# Patient Record
Sex: FEMALE | Race: WHITE | NOT HISPANIC OR LATINO | Employment: FULL TIME | URBAN - METROPOLITAN AREA
[De-identification: names, ages, dates, MRNs, and addresses within clinical notes are randomized per-mention and may not be internally consistent; named-entity substitution may affect disease eponyms.]

---

## 2018-03-22 ENCOUNTER — TRANSCRIBE ORDERS (OUTPATIENT)
Dept: ADMINISTRATIVE | Facility: HOSPITAL | Age: 53
End: 2018-03-22

## 2018-03-22 ENCOUNTER — HOSPITAL ENCOUNTER (OUTPATIENT)
Dept: GENERAL RADIOLOGY | Facility: HOSPITAL | Age: 53
Discharge: HOME | End: 2018-03-22
Attending: FAMILY MEDICINE | Admitting: FAMILY MEDICINE

## 2018-03-22 DIAGNOSIS — M25.552 LEFT HIP PAIN: Primary | ICD-10-CM

## 2018-03-22 PROCEDURE — 73502 X-RAY EXAM HIP UNI 2-3 VIEWS: CPT

## 2019-11-13 RX ORDER — SODIUM, POTASSIUM,MAG SULFATES 17.5-3.13G
2 SOLUTION, RECONSTITUTED, ORAL ORAL TAKE AS DIRECTED
Qty: 354 ML | Refills: 0 | Status: SHIPPED | OUTPATIENT
Start: 2019-11-13 | End: 2019-11-13 | Stop reason: ALTCHOICE

## 2019-11-22 ENCOUNTER — OUTSIDE FACILITY SERVICE (OUTPATIENT)
Dept: GASTROENTEROLOGY | Facility: CLINIC | Age: 54
End: 2019-11-22

## 2019-11-22 PROCEDURE — G0105 COLORECTAL SCRN; HI RISK IND: HCPCS | Performed by: INTERNAL MEDICINE

## 2022-12-20 ENCOUNTER — OFFICE VISIT (OUTPATIENT)
Dept: PULMONOLOGY | Facility: CLINIC | Age: 57
End: 2022-12-20

## 2022-12-20 VITALS
TEMPERATURE: 97.6 F | HEIGHT: 63 IN | HEART RATE: 64 BPM | SYSTOLIC BLOOD PRESSURE: 128 MMHG | OXYGEN SATURATION: 99 % | BODY MASS INDEX: 25.69 KG/M2 | DIASTOLIC BLOOD PRESSURE: 80 MMHG | WEIGHT: 145 LBS

## 2022-12-20 DIAGNOSIS — J30.2 SEASONAL AND PERENNIAL ALLERGIC RHINITIS: ICD-10-CM

## 2022-12-20 DIAGNOSIS — Z87.891 FORMER SMOKER: ICD-10-CM

## 2022-12-20 DIAGNOSIS — K44.9 HIATAL HERNIA: ICD-10-CM

## 2022-12-20 DIAGNOSIS — K21.9 CHRONIC GERD: ICD-10-CM

## 2022-12-20 DIAGNOSIS — R05.9 COUGH, UNSPECIFIED TYPE: Primary | ICD-10-CM

## 2022-12-20 DIAGNOSIS — J30.89 SEASONAL AND PERENNIAL ALLERGIC RHINITIS: ICD-10-CM

## 2022-12-20 PROBLEM — R05.3 CHRONIC COUGH: Status: ACTIVE | Noted: 2022-12-20

## 2022-12-20 PROCEDURE — 94375 RESPIRATORY FLOW VOLUME LOOP: CPT | Performed by: INTERNAL MEDICINE

## 2022-12-20 PROCEDURE — 94726 PLETHYSMOGRAPHY LUNG VOLUMES: CPT | Performed by: INTERNAL MEDICINE

## 2022-12-20 PROCEDURE — 94729 DIFFUSING CAPACITY: CPT | Performed by: INTERNAL MEDICINE

## 2022-12-20 PROCEDURE — 99204 OFFICE O/P NEW MOD 45 MIN: CPT | Performed by: INTERNAL MEDICINE

## 2022-12-20 RX ORDER — LANOLIN ALCOHOL/MO/W.PET/CERES
1000 CREAM (GRAM) TOPICAL DAILY
COMMUNITY

## 2022-12-20 RX ORDER — CHOLECALCIFEROL (VITAMIN D3) 1250 MCG
CAPSULE ORAL EVERY 24 HOURS
COMMUNITY

## 2022-12-20 RX ORDER — FAMOTIDINE 40 MG/1
40 TABLET, FILM COATED ORAL
COMMUNITY
Start: 2022-10-24

## 2022-12-20 RX ORDER — DIPHENHYDRAMINE HYDROCHLORIDE 25 MG/1
TABLET ORAL 2 TIMES DAILY
COMMUNITY

## 2022-12-20 RX ORDER — ESTRADIOL 0.5 MG/1
0.5 TABLET ORAL DAILY
COMMUNITY
Start: 2022-10-24

## 2022-12-20 RX ORDER — CALCIUM CARBONATE/VITAMIN D3 500-10/5ML
LIQUID (ML) ORAL SEE ADMIN INSTRUCTIONS
COMMUNITY

## 2022-12-20 RX ORDER — FREMANEZUMAB-VFRM 225 MG/1.5ML
INJECTION SUBCUTANEOUS
COMMUNITY
Start: 2022-10-27

## 2022-12-20 RX ORDER — MULTIVIT WITH MINERALS/LUTEIN
250 TABLET ORAL DAILY
COMMUNITY

## 2022-12-20 RX ORDER — TIOTROPIUM BROMIDE INHALATION SPRAY 3.12 UG/1
2 SPRAY, METERED RESPIRATORY (INHALATION) DAILY
COMMUNITY
Start: 2022-10-24

## 2022-12-20 RX ORDER — LEVOCETIRIZINE DIHYDROCHLORIDE 5 MG/1
5 TABLET, FILM COATED ORAL DAILY
COMMUNITY
Start: 2022-12-12

## 2022-12-20 RX ORDER — PROGESTERONE 100 MG/1
100 CAPSULE ORAL
COMMUNITY
Start: 2022-10-24

## 2022-12-20 NOTE — PROGRESS NOTES
"  PULMONARY  NOTE    Chief Complaint     Chronic cough, remote smoker, hiatal hernia, perennial rhinitis    History of Present Illness     57-year-old female returns today for reevaluation  She was previously seen by Dr. Hoffmann over 10 years ago    She is had a chronic cough  This is been going on for over 10 years  The cough is never resolved  It waxes and wanes depending upon various factors  It will get worse if she has a respiratory tract infection  Its precipitated by a lot of different factors such as weather changes, laughing or talking, and various times of the day  Typically she will start coughing and then will produce \"tons of mucus\".  This is typically clear mucus and may be up to 1/4 cup  She is never had hemoptysis    There is nothing that she is identified that has helped with her cough    Past pulmonary work-up is included multiple PFTs, chest imaging, and a bronchoscopy  She is never exhibited airway obstruction on spirometry  She had a negative methacholine challenge and has been on multiple inhalers none of which have impacted her cough    She has seen allergy and immunology  She has had allergy testing and apparently has some limited environmental allergies  Immunotherapy has been discussed in the past but not pursued  She is on a variety of medications and supplements as outlined in her medication list    Her cough preceded her use of progesterone    She is had a GI evaluation  She has had an EGD and a 24-hour pH monitor  Apparently the 24-hour pH monitor was \"borderline\" although I do not have that result in the computer right now  She does try to follow reflux precautions    She has a chronic globus sensation    Patient Active Problem List   Diagnosis   • Chronic cough   • GERD   • Seasonal and perennial allergic rhinitis   • Remote smoker   • Hiatal hernia     No Known Allergies    Current Outpatient Medications:   •  Ajovy 225 MG/1.5ML solution prefilled syringe, , Disp: , Rfl:   •  Biotin " 5 MG capsule, 2 (Two) Times a Day., Disp: , Rfl:   •  Cholecalciferol (Vitamin D3) 1.25 MG (70268 UT) capsule, Daily., Disp: , Rfl:   •  Chromium Picolinate (CHROMIUM PICOLATE PO), Take  by mouth., Disp: , Rfl:   •  Coenzyme Q10 (Co Q 10) 100 MG capsule, Daily., Disp: , Rfl:   •  Cranberry 300 MG tablet, See Admin Instructions., Disp: , Rfl:   •  estradiol (ESTRACE) 0.5 MG tablet, Take 0.5 mg by mouth Daily., Disp: , Rfl:   •  famotidine (PEPCID) 40 MG tablet, Take 40 mg by mouth every night at bedtime., Disp: , Rfl:   •  levocetirizine (XYZAL) 5 MG tablet, Take 5 mg by mouth Daily., Disp: , Rfl:   •  Magnesium Oxide 400 MG capsule, See Admin Instructions., Disp: , Rfl:   •  Omega 3-6-9 Fatty Acids (OMEGA-3-6-9 PO), Take  by mouth., Disp: , Rfl:   •  Progesterone (PROMETRIUM) 100 MG capsule, Take 100 mg by mouth every night at bedtime., Disp: , Rfl:   •  Sod Picosulfate-Mag Ox-Cit Acd (CLENPIQ) 10-3.5-12 MG-GM -GM/160ML solution, Take 1 kit by mouth Take As Directed. Do Not Eat The Day Before Your Procedure, Call [phone] for questions., Disp: 2 bottle, Rfl: 0  •  Spiriva Respimat 2.5 MCG/ACT aerosol solution inhaler, Inhale 2 puffs Daily., Disp: , Rfl:   •  Turmeric (QC TUMERIC COMPLEX PO), Take  by mouth., Disp: , Rfl:   •  vitamin B-12 (CYANOCOBALAMIN) 1000 MCG tablet, Take 1,000 mcg by mouth Daily., Disp: , Rfl:   •  vitamin C (ASCORBIC ACID) 250 MG tablet, Take 250 mg by mouth Daily., Disp: , Rfl:   •  Zinc 50 MG capsule, Daily., Disp: , Rfl:   MEDICATION LIST AND ALLERGIES REVIEWED.    Family History   Problem Relation Age of Onset   • Stroke Mother    • Cancer Mother    • Pulmonary fibrosis Father    • Hypertension Father    • Alcohol abuse Sister      Social History     Tobacco Use   • Smoking status: Former     Years: 15.00     Types: Cigarettes     Quit date: 1993     Years since quittin.9   Vaping Use   • Vaping Use: Never used   Substance Use Topics   • Alcohol use: Yes     Alcohol/week: 3.0  "standard drinks     Types: 3 Glasses of wine per week   • Drug use: Never     Social History     Social History Narrative        Smoked for about 15 years, none since 1993    Drinks approximately 3 alcoholic beverages on a weekly basis     FAMILY AND SOCIAL HISTORY REVIEWED.    Review of Systems  ALSO REFER TO SCANNED ROS SHEET FROM SAME DATE.    /80   Pulse 64   Temp 97.6 °F (36.4 °C)   Ht 160 cm (63\")   Wt 65.8 kg (145 lb)   SpO2 99% Comment: RESTING, ROOM AIR  BMI 25.69 kg/m²   Physical Exam  Vitals and nursing note reviewed.   Constitutional:       General: She is not in acute distress.     Appearance: She is well-developed. She is not diaphoretic.   HENT:      Head: Normocephalic and atraumatic.   Neck:      Thyroid: No thyromegaly.   Cardiovascular:      Rate and Rhythm: Normal rate and regular rhythm.      Heart sounds: Normal heart sounds. No murmur heard.  Pulmonary:      Effort: Pulmonary effort is normal.      Breath sounds: Normal breath sounds. No stridor.   Lymphadenopathy:      Cervical: No cervical adenopathy.      Upper Body:      Right upper body: No supraclavicular or epitrochlear adenopathy.      Left upper body: No supraclavicular or epitrochlear adenopathy.   Skin:     General: Skin is warm and dry.   Neurological:      Mental Status: She is alert and oriented to person, place, and time.   Psychiatric:         Behavior: Behavior normal.         Results     Chest x-ray reveals no effusions, infiltrates, consolidation    PFTs reveal no airway obstruction, no restriction, and a normal diffusion capacity    Immunization History   Administered Date(s) Administered   • COVID-19 (PFIZER) PURPLE CAP 01/19/2021, 02/12/2021, 10/01/2021   • Flu Vaccine Split Quad 10/16/2009, 10/08/2010, 10/12/2012, 11/03/2015, 09/30/2016, 10/05/2017, 09/29/2019, 09/17/2020   • Hepatitis A 01/20/2018   • Influenza Injectable Mdck Pf Quad 09/17/2020, 10/01/2021   • Influenza Seasonal Injectable " 09/29/2019, 09/17/2020   • Influenza, Unspecified 10/03/2022   • MMR 09/29/2019   • Pneumococcal Conjugate 13-Valent (PCV13) 11/17/2015   • Pneumococcal Conjugate 20-Valent (PCV20) 10/03/2022   • Shingrix 10/01/2018   • Tdap 11/17/2015, 01/15/2019, 09/29/2019   • Varicella 04/14/2018   • Zostavax 11/17/2015, 04/14/2018, 08/17/2018     Problem List       ICD-10-CM ICD-9-CM   1. Chronic cough  R05.9 786.2   2. Seasonal and perennial allergic rhinitis  J30.89 477.9    J30.2    3. Remote smoker  Z87.891 V15.82   4. Hiatal hernia  K44.9 553.3   5. GERD  K21.9 530.81       Discussion     Her exam, PFTs, and chest x-ray are unremarkable today    We reviewed her current and previous work-up  Pulmonary work-up is included multiple PFTs all of which have been normal with no evidence of airway obstruction  A negative methacholine challenge  Negative/normal chest imaging  And an unremarkable bronchoscopy    I have recommended a high-resolution CT scan of the chest to look for underlying bronchiectasis  I am not sure that there will be any benefit to repeating a bronchoscopy or a methacholine challenge at this point    Upper airway cough syndrome and reflux continue to be high on the list of contributing factors  She had a borderline 24-hour pH monitor  She does have a hiatal hernia on prior imaging  She has not had symptomatic response to PPI therapy but that does not exclude reflux as a cause of her cough  I have reinforced reflux precautions  She is can follow-up with Dr. Westbrook's    Perennial rhinitis and upper airway cough syndrome appears to be a contributing factor  Apparently she has some limited environmental allergies and immunotherapy has been recommended in the past  I am not sure if that would be helpful    We will follow-up with her after her high resolution CT scan of the chest    Moderate level of Medical Decision Making complexity based on 1 undiagnosed new problem, independent interpretation of tests,  and/or prescription drug management     Urbano Sullivan MD  Note electronically signed    CC: Charlette Gillis MD

## 2022-12-21 DIAGNOSIS — R05.9 COUGH, UNSPECIFIED TYPE: Primary | ICD-10-CM

## 2022-12-30 ENCOUNTER — OFFICE VISIT (OUTPATIENT)
Dept: GASTROENTEROLOGY | Facility: CLINIC | Age: 57
End: 2022-12-30

## 2022-12-30 VITALS
TEMPERATURE: 97 F | SYSTOLIC BLOOD PRESSURE: 112 MMHG | DIASTOLIC BLOOD PRESSURE: 66 MMHG | HEIGHT: 63 IN | OXYGEN SATURATION: 94 % | WEIGHT: 146 LBS | HEART RATE: 61 BPM | BODY MASS INDEX: 25.87 KG/M2

## 2022-12-30 DIAGNOSIS — R12 HEARTBURN: ICD-10-CM

## 2022-12-30 DIAGNOSIS — R05.3 CHRONIC COUGH: Primary | ICD-10-CM

## 2022-12-30 DIAGNOSIS — Z83.71 FAMILY HISTORY OF POLYPS IN THE COLON: ICD-10-CM

## 2022-12-30 PROCEDURE — 99204 OFFICE O/P NEW MOD 45 MIN: CPT | Performed by: NURSE PRACTITIONER

## 2022-12-30 RX ORDER — DEXLANSOPRAZOLE 60 MG/1
60 CAPSULE, DELAYED RELEASE ORAL DAILY
Qty: 30 CAPSULE | Refills: 0 | Status: SHIPPED | OUTPATIENT
Start: 2022-12-30 | End: 2022-12-30

## 2022-12-30 RX ORDER — DEXLANSOPRAZOLE 60 MG/1
60 CAPSULE, DELAYED RELEASE ORAL DAILY
Qty: 90 CAPSULE | Refills: 3 | Status: SHIPPED | OUTPATIENT
Start: 2022-12-30

## 2022-12-30 NOTE — PROGRESS NOTES
GASTROENTEROLOGY OFFICE NOTE    Dirk Valdez  [id number]  1965    CARE TEAM  Patient Care Team:  Charlette Gillis MD as PCP - General  Charlette Gillis MD as PCP - Family Medicine  Urbano Sullivan MD as Emergency Attending (Pulmonary Disease)    Referring Provider: No ref. provider found    Chief Complaint   Patient presents with   • Heartburn     Reflux   • Cough        HISTORY OF PRESENT ILLNESS:   Dirk Valdez is a 57 y.o. female who presents to the clinic today for evaluation regarding chronic cough which likely began over 10 years ago.     She reports initial evaluation several years ago with Dr. Ordoñez followed by evaluation by Dr. Soria with EGD and 24-hour pH monitoring, diagnosis of hiatal hernia, biopsy of the duodenum negative for celiac but no improvement in chronic cough with treatment of reflux.    She has more recently had a evaluation by Dr. Sullivan (pulmonologist), allergist with positive test for walnuts, ENT.    She reports intermittent use of humidifier, azelastine, famotidine at bedtime for several months, propping the head of the bed up, omeprazole twice daily, esomeprazole twice daily, lansoprazole, rabeprazole, Tessalon Perles, metoclopramide without improvement in cough.  She also reports use of many different inhalers for cough without improvement.    She reports she usually wakes up with a cough that starts out dry and tight feeling and then she will have production of sticky phlegm that has been found to be aggravated by sleeping, feeling excited, laughing, cold air, eating.    She reports experiencing some heartburn over the past year.    She expresses concern regarding damage to the esophagus from reflux.    Review of evaluation by Dr. Sullivan, pulmonology, due to chronic cough with documentation and has been ongoing for greater than 10 years, aggravated by respiratory tract infection, weather change, laughing, talking, various times of day, productive  at times.  Perennial rhinitis and upper airway cough syndrome appear to be contributing factor, follow-up after high-resolution CT of the chest.  CT scheduled for 2023.     The office attempted to find EGD per Dr. Soria in practice fusion as well as pH result but unable to locate results during today's office visit.  It seems as though she may have had pH test at [facility] years ago.  Past Medical History:   Diagnosis Date   • Anemia    • Bladder infection    • Migraine    • Pneumonia         Past Surgical History:   Procedure Laterality Date   • BRONCHOSCOPY     •  SECTION  1993   •  SECTION  1999   • COLONOSCOPY     • UPPER GASTROINTESTINAL ENDOSCOPY          Current Outpatient Medications on File Prior to Visit   Medication Sig   • Ajovy 225 MG/1.5ML solution prefilled syringe    • Biotin 5 MG capsule 2 (Two) Times a Day.   • Cholecalciferol (Vitamin D3) 1.25 MG (09212 UT) capsule Daily.   • Chromium Picolinate (CHROMIUM PICOLATE PO) Take  by mouth.   • Coenzyme Q10 (Co Q 10) 100 MG capsule Daily.   • Cranberry 300 MG tablet See Admin Instructions.   • estradiol (ESTRACE) 0.5 MG tablet Take 0.5 mg by mouth Daily.   • famotidine (PEPCID) 40 MG tablet Take 40 mg by mouth every night at bedtime.   • levocetirizine (XYZAL) 5 MG tablet Take 5 mg by mouth Daily.   • Magnesium Oxide 400 MG capsule See Admin Instructions.   • Omega 3-6-9 Fatty Acids (OMEGA-3-6-9 PO) Take  by mouth.   • Progesterone (PROMETRIUM) 100 MG capsule Take 100 mg by mouth every night at bedtime.   • Spiriva Respimat 2.5 MCG/ACT aerosol solution inhaler Inhale 2 puffs Daily.   • Turmeric (QC TUMERIC COMPLEX PO) Take  by mouth.   • vitamin B-12 (CYANOCOBALAMIN) 1000 MCG tablet Take 1,000 mcg by mouth Daily.   • vitamin C (ASCORBIC ACID) 250 MG tablet Take 250 mg by mouth Daily.   • Zinc 50 MG capsule Daily.   • [DISCONTINUED] Sod Picosulfate-Mag Ox-Cit Acd (CLENPIQ) 10-3.5-12 MG-GM -GM/160ML solution Take 1 kit  "by mouth Take As Directed. Do Not Eat The Day Before Your Procedure, Call [phone] for questions.     No current facility-administered medications on file prior to visit.       No Known Allergies    Family History   Problem Relation Age of Onset   • Stroke Mother    • Cancer Mother    • Pulmonary fibrosis Father    • Hypertension Father    • Alcohol abuse Sister        Social History     Socioeconomic History   • Marital status:    Tobacco Use   • Smoking status: Former     Years: 15.00     Types: Cigarettes     Quit date:      Years since quittin.0   • Smokeless tobacco: Never   Vaping Use   • Vaping Use: Never used   Substance and Sexual Activity   • Alcohol use: Not Currently     Comment: socially   • Drug use: Never   • Sexual activity: Defer       PHYSICAL EXAM   /66 (BP Location: Left arm, Patient Position: Sitting, Cuff Size: Adult)   Pulse 61   Temp 97 °F (36.1 °C) (Infrared)   Ht 160 cm (63\")   Wt 66.2 kg (146 lb)   SpO2 94%   BMI 25.86 kg/m²   Physical Exam  Constitutional:       General: She is not in acute distress.     Appearance: She is not toxic-appearing.   HENT:      Head: Normocephalic and atraumatic. No contusion.      Right Ear: External ear normal.      Left Ear: External ear normal.   Eyes:      General: Lids are normal. No scleral icterus.        Right eye: No discharge.         Left eye: No discharge.      Extraocular Movements: Extraocular movements intact.   Neck:      Trachea: Trachea normal.      Comments: No visible mass  No visible adenopathy  Cardiovascular:      Rate and Rhythm: Normal rate.   Pulmonary:      Effort: No respiratory distress.      Comments: Symmetrical expansion    Musculoskeletal:      Comments: Symmetrical movement of upper extremities  Symmetrical movement of lower extremities  No visible deformities   Skin:     General: Skin is warm and dry.      Coloration: Skin is not jaundiced.   Neurological:      General: No focal deficit " present.      Mental Status: She is alert and oriented to person, place, and time.   Psychiatric:         Mood and Affect: Mood normal.         Behavior: Behavior normal.         Thought Content: Thought content normal.     Results Review:  11/22/2019 colonoscopy per Dr. Soria due to family history history of colon polyps with excellent bowel prep revealed lipoma in the ascending colon, minimal sigmoid diverticulosis with recommendation to repeat colonoscopy in 5 years due to family history of colon polyps      ASSESSMENT / PLAN  1. Chronic cough  2. Heartburn  -At this time it is unknown if reflux is causing cough but due to concern for reflux causing cough and intermittent heartburn as well as previously failing omeprazole, esomeprazole, lansoprazole, rabeprazole, metoclopramide, Tessalon Perles, inhalers, intermittent use of humidifier, recommend trial of taking Dexilant daily but she may choose to wait on starting Dexilant until after the EGD if she would like to see what EGD shows of proton pump inhibitor.  I am concerned her insurance will not cover Dexilant as it is not on some pharmacy formularies.  -She may stop famotidine at bedtime if she does not feel as though it is helpful  Printed information regarding lifestyle modifications for reflux was provided in the office, she reports she sleeps with her head elevated which does not seem helpful.  We briefly discussed it may be helpful to decrease coffee intake  - dexlansoprazole (Dexilant) 60 MG capsule; Take 1 capsule by mouth Daily for 30 days.  Dispense: 30 capsule; Refill: 0    3. Family history of polyps in the colon  -Due for screening colonoscopy 11/22/2024     I am concerned that we may not find a specific GI etiology for chronic cough as it seems as though approximately 10 to 12 years ago she underwent EGD and 24-hour pH monitoring and treatment due to chronic cough without specific etiology nor ability to control chronic cough.      She has  had evaluation by allergist, ENT, pulmonary.  She is awaiting a CT scan of the chest ordered by pulmonary in a few weeks.    She expressed concern about damage to the esophagus from reflux for which I recommend repeat EGD for additional evaluation which she seemed hesitant to schedule but I discussed this is the best way to evaluate for damage to the esophagus from reflux.  It would also be helpful to evaluate for a cervical inlet patch that can in some patients cause chronic cough and improved with ablation of cervical inlet patch.    Consider manometry and 24-hour pH in the future although 24-hour pH is likely repeat test  Return for Follow-up after EGD.  Tracie Conroy, APRN  12/30/2022

## 2022-12-30 NOTE — PATIENT INSTRUCTIONS
Follow a diet as recommended by your health care provider. This may involve avoiding foods and drinks such as:  Coffee and tea (with or without caffeine).  Drinks that contain alcohol.  Energy drinks and sports drinks.  Carbonated drinks or sodas.  Chocolate and cocoa.  Peppermint and mint flavorings.  Garlic and onions.  Horseradish.  Spicy and acidic foods, including peppers, chili powder, tineo powder, vinegar, hot sauces, and barbecue sauce.  Citrus fruit juices and citrus fruits, such as oranges, liat, and limes.  Tomato-based foods, such as red sauce, chili, salsa, and pizza with red sauce.  Fried and fatty foods, such as donuts, french fries, potato chips, and high-fat dressings.    Eat small, frequent meals instead of large meals.  Avoid drinking large amounts of liquid with your meals.  Avoid eating meals during the 2-3 hours before bedtime.  Avoid lying down right after you eat.

## 2023-01-03 ENCOUNTER — PRIOR AUTHORIZATION (OUTPATIENT)
Dept: GASTROENTEROLOGY | Facility: CLINIC | Age: 58
End: 2023-01-03
Payer: COMMERCIAL

## 2023-01-09 ENCOUNTER — TELEPHONE (OUTPATIENT)
Dept: GASTROENTEROLOGY | Facility: CLINIC | Age: 58
End: 2023-01-09

## 2023-01-09 NOTE — TELEPHONE ENCOUNTER
Patient called to let us know that even with PA approved from her insurance, her Dexilant would still be $600 for a 90 day supply. She would like to know if there are any coupons we can get for her or an alternative if necessary. Thanks!

## 2023-01-09 NOTE — TELEPHONE ENCOUNTER
Fax received to notify us that medication is approved through 1/8/24, called patient to inform her of approval

## 2023-01-12 ENCOUNTER — APPOINTMENT (OUTPATIENT)
Dept: CT IMAGING | Facility: HOSPITAL | Age: 58
End: 2023-01-12
Payer: COMMERCIAL

## 2023-01-18 ENCOUNTER — TRANSCRIBE ORDERS (OUTPATIENT)
Dept: PULMONOLOGY | Facility: CLINIC | Age: 58
End: 2023-01-18
Payer: COMMERCIAL

## 2023-01-18 DIAGNOSIS — Z00.6 EXAMINATION FOR NORMAL COMPARISON OR CONTROL IN CLINICAL RESEARCH: Primary | ICD-10-CM

## 2023-01-20 DIAGNOSIS — R05.9 COUGH, UNSPECIFIED TYPE: ICD-10-CM

## 2023-02-07 ENCOUNTER — OFFICE VISIT (OUTPATIENT)
Dept: PULMONOLOGY | Facility: CLINIC | Age: 58
End: 2023-02-07
Payer: COMMERCIAL

## 2023-02-07 VITALS
TEMPERATURE: 98.7 F | OXYGEN SATURATION: 97 % | SYSTOLIC BLOOD PRESSURE: 130 MMHG | HEIGHT: 63 IN | BODY MASS INDEX: 26.36 KG/M2 | DIASTOLIC BLOOD PRESSURE: 80 MMHG | HEART RATE: 67 BPM | WEIGHT: 148.8 LBS

## 2023-02-07 DIAGNOSIS — K21.9 CHRONIC GERD: ICD-10-CM

## 2023-02-07 DIAGNOSIS — R05.3 CHRONIC COUGH: ICD-10-CM

## 2023-02-07 DIAGNOSIS — J47.9 BRONCHIECTASIS WITHOUT COMPLICATION: Primary | ICD-10-CM

## 2023-02-07 PROCEDURE — 99214 OFFICE O/P EST MOD 30 MIN: CPT | Performed by: NURSE PRACTITIONER

## 2023-02-07 RX ORDER — GUAIFENESIN 600 MG/1
600 TABLET, EXTENDED RELEASE ORAL 2 TIMES DAILY
Qty: 180 TABLET | Refills: 3 | Status: SHIPPED | OUTPATIENT
Start: 2023-02-07

## 2023-02-08 NOTE — PROGRESS NOTES
[facility] Pulmonary Follow up    CHIEF COMPLAINT    Cough    HISTORY OF PRESENT ILLNESS    Dirk Valdez is a 57 y.o.female here today for follow-up of her cough.  She was last seen in the office by Dr. Sullivan in December.  She continues to have a chronic cough that has been going on for at least 12 years.  She states she does produce clear mucus every day.    She has tried multiple aids to help with her cough but nothing has improved her symptoms.  In the past she has had a bronchoscopy and a negative methacholine challenge.  She has also had normal PFTs.    She has seen allergy and immunology.  She also seen GI and was told that her 24-hour pH monitor was borderline.  She does follow reflux precautions closely.    She denies any hemoptysis.  She denies any chest pain or palpitations.  She denies any lower extremity edema or calf tenderness.    She quit smoking in 1993.    Patient Active Problem List   Diagnosis   • Chronic cough   • GERD   • Seasonal and perennial allergic rhinitis   • Remote smoker   • Hiatal hernia       No Known Allergies    Current Outpatient Medications:   •  Ajovy 225 MG/1.5ML solution prefilled syringe, , Disp: , Rfl:   •  Biotin 5 MG capsule, 2 (Two) Times a Day., Disp: , Rfl:   •  Cholecalciferol (Vitamin D3) 1.25 MG (59761 UT) capsule, Daily., Disp: , Rfl:   •  Chromium Picolinate (CHROMIUM PICOLATE PO), Take  by mouth., Disp: , Rfl:   •  Coenzyme Q10 (Co Q 10) 100 MG capsule, Daily., Disp: , Rfl:   •  Cranberry 300 MG tablet, See Admin Instructions., Disp: , Rfl:   •  dexlansoprazole (Dexilant) 60 MG capsule, Take 1 capsule by mouth Daily., Disp: 90 capsule, Rfl: 3  •  estradiol (ESTRACE) 0.5 MG tablet, Take 0.5 mg by mouth Daily., Disp: , Rfl:   •  famotidine (PEPCID) 40 MG tablet, Take 40 mg by mouth every night at bedtime., Disp: , Rfl:   •  levocetirizine (XYZAL) 5 MG tablet, Take 5 mg by mouth Daily., Disp: , Rfl:   •  Magnesium Oxide 400 MG capsule, See Admin Instructions.,  Disp: , Rfl:   •  Omega 3-6-9 Fatty Acids (OMEGA-3-6-9 PO), Take  by mouth., Disp: , Rfl:   •  Progesterone (PROMETRIUM) 100 MG capsule, Take 100 mg by mouth every night at bedtime., Disp: , Rfl:   •  Spiriva Respimat 2.5 MCG/ACT aerosol solution inhaler, Inhale 2 puffs Daily., Disp: , Rfl:   •  Turmeric (QC TUMERIC COMPLEX PO), Take  by mouth., Disp: , Rfl:   •  vitamin B-12 (CYANOCOBALAMIN) 1000 MCG tablet, Take 1,000 mcg by mouth Daily., Disp: , Rfl:   •  vitamin C (ASCORBIC ACID) 250 MG tablet, Take 250 mg by mouth Daily., Disp: , Rfl:   •  Zinc 50 MG capsule, Daily., Disp: , Rfl:   •  guaiFENesin (Mucinex) 600 MG 12 hr tablet, Take 1 tablet by mouth 2 (Two) Times a Day., Disp: 180 tablet, Rfl: 3  MEDICATION LIST AND ALLERGIES REVIEWED.    Social History     Tobacco Use   • Smoking status: Former     Years: 15.00     Types: Cigarettes     Quit date:      Years since quittin.1   • Smokeless tobacco: Never   Vaping Use   • Vaping Use: Never used   Substance Use Topics   • Alcohol use: Not Currently     Comment: socially   • Drug use: Never       FAMILY AND SOCIAL HISTORY REVIEWED.    Review of Systems   Constitutional: Negative for activity change, appetite change, fatigue, fever and unexpected weight change.   HENT: Negative for congestion, postnasal drip, rhinorrhea, sinus pressure, sore throat and voice change.    Eyes: Negative for visual disturbance.   Respiratory: Positive for cough. Negative for chest tightness, shortness of breath and wheezing.    Cardiovascular: Negative for chest pain, palpitations and leg swelling.   Gastrointestinal: Negative for abdominal distention, abdominal pain, nausea and vomiting.   Endocrine: Negative for cold intolerance and heat intolerance.   Genitourinary: Negative for difficulty urinating and urgency.   Musculoskeletal: Negative for arthralgias, back pain and neck pain.   Skin: Negative for color change and pallor.   Allergic/Immunologic: Negative for  "environmental allergies and food allergies.   Neurological: Negative for dizziness, syncope, weakness and light-headedness.   Hematological: Negative for adenopathy. Does not bruise/bleed easily.   Psychiatric/Behavioral: Negative for agitation and behavioral problems.   .    /80   Pulse 67   Temp 98.7 °F (37.1 °C) (Infrared)   Ht 160 cm (63\")   Wt 67.5 kg (148 lb 12.8 oz)   SpO2 97% Comment: RESTING ROOM AIR  BMI 26.36 kg/m²     Immunization History   Administered Date(s) Administered   • COVID-19 (PFIZER) PURPLE CAP 01/19/2021, 02/12/2021, 10/01/2021   • Flu Vaccine Split Quad 10/16/2009, 10/08/2010, 10/12/2012, 11/03/2015, 09/30/2016, 10/05/2017, 09/29/2019, 09/17/2020   • Hepatitis A 01/20/2018   • Influenza Injectable Mdck Pf Quad 09/17/2020, 10/01/2021   • Influenza Seasonal Injectable 09/29/2019, 09/17/2020   • Influenza, Unspecified 10/03/2022   • MMR 09/29/2019   • Pneumococcal Conjugate 13-Valent (PCV13) 11/17/2015   • Pneumococcal Conjugate 20-Valent (PCV20) 10/03/2022   • Shingrix 10/01/2018   • Tdap 11/17/2015, 01/15/2019, 09/29/2019   • Varicella 04/14/2018   • Zostavax 11/17/2015, 04/14/2018, 08/17/2018       Physical Exam  Vitals and nursing note reviewed.   Constitutional:       Appearance: She is well-developed. She is not diaphoretic.   HENT:      Head: Normocephalic and atraumatic.   Eyes:      Pupils: Pupils are equal, round, and reactive to light.   Neck:      Thyroid: No thyromegaly.   Cardiovascular:      Rate and Rhythm: Normal rate and regular rhythm.      Heart sounds: Normal heart sounds. No murmur heard.    No friction rub. No gallop.   Pulmonary:      Effort: Pulmonary effort is normal. No respiratory distress.      Breath sounds: Normal breath sounds. No wheezing or rales.   Chest:      Chest wall: No tenderness.   Abdominal:      General: Bowel sounds are normal.      Palpations: Abdomen is soft.      Tenderness: There is no abdominal tenderness.   Musculoskeletal:         " General: No swelling. Normal range of motion.      Cervical back: Normal range of motion and neck supple.   Lymphadenopathy:      Cervical: No cervical adenopathy.   Skin:     General: Skin is warm and dry.      Capillary Refill: Capillary refill takes less than 2 seconds.   Neurological:      Mental Status: She is alert and oriented to person, place, and time.   Psychiatric:         Mood and Affect: Mood normal.         Behavior: Behavior normal.           RESULTS    CT chest without contrast on 1/17/2023: Mild centrilobular emphysema with traction bronchiectasis and bronchial wall thickening, no airspace consolidation.  Scattered small pulmonary nodules recommend a repeat CT scan in 1 year.    PROBLEM LIST    Problem List Items Addressed This Visit        Gastrointestinal Abdominal     GERD       Pulmonary and Pneumonias    Chronic cough   Other Visit Diagnoses     Bronchiectasis without complication (HCC)    -  Primary    Relevant Medications    guaiFENesin (Mucinex) 600 MG 12 hr tablet            DISCUSSION    Ms. Valdez was here for follow-up of her chronic cough.  She has tried multiple things to help her chronic cough but has not seen much improvement.    We did review her CT scan without contrast on 1/17 that shows traction bronchiectasis.  We did discuss bronchiectasis in general and she is good to start Mucinex twice a day.  We also discussed a flutter valve and I have ordered this for her today.    She will continue to follow reflux precautions closely.    She will follow-up in 6 months or sooner if her symptoms worsen.  Advised her to call with any additional concerns or questions.    I personally spent a total of 32 minutes on patient visit today including chart review, face to face with the patient obtaining the history and physical exam, review of pertinent images and tests, counseling and discussion and/or coordination of care as described above, and documentation.  Total time excludes time spent  on other separate services such as performing procedures or test interpretation, if applicable.        Alecia Radford, APRN  02/07/202310:35 EST  Electronically signed     Please note that portions of this note were completed with a voice recognition program.        CC: Charlette Gillis MD

## 2023-02-13 ENCOUNTER — TELEPHONE (OUTPATIENT)
Dept: PULMONOLOGY | Facility: CLINIC | Age: 58
End: 2023-02-13
Payer: COMMERCIAL

## 2023-02-13 DIAGNOSIS — R05.3 CHRONIC COUGH: Primary | ICD-10-CM

## 2023-02-13 DIAGNOSIS — R05.3 CHRONIC COUGH: ICD-10-CM

## 2023-02-13 RX ORDER — FLUTICASONE FUROATE AND VILANTEROL 100; 25 UG/1; UG/1
1 POWDER RESPIRATORY (INHALATION)
Qty: 60 EACH | Refills: 2 | Status: SHIPPED | OUTPATIENT
Start: 2023-02-13 | End: 2023-02-13

## 2023-02-13 RX ORDER — FLUTICASONE FUROATE AND VILANTEROL 100; 25 UG/1; UG/1
1 POWDER RESPIRATORY (INHALATION)
Qty: 60 EACH | Refills: 2 | Status: SHIPPED | OUTPATIENT
Start: 2023-02-13 | End: 2023-02-13 | Stop reason: SDUPTHER

## 2023-02-13 RX ORDER — FLUTICASONE PROPIONATE AND SALMETEROL 100; 50 UG/1; UG/1
1 POWDER RESPIRATORY (INHALATION)
Qty: 60 EACH | Refills: 5 | Status: SHIPPED | OUTPATIENT
Start: 2023-02-13

## 2023-02-13 NOTE — TELEPHONE ENCOUNTER
Pt called in requesting a refill for breo ellipta it wasn't cover by insurance, she was told advair diskus was the alternative that would be covered.pt needs a refill

## 2023-04-06 ENCOUNTER — OUTSIDE FACILITY SERVICE (OUTPATIENT)
Dept: GASTROENTEROLOGY | Facility: CLINIC | Age: 58
End: 2023-04-06
Payer: COMMERCIAL

## 2023-04-06 PROCEDURE — 88305 TISSUE EXAM BY PATHOLOGIST: CPT | Performed by: INTERNAL MEDICINE

## 2023-04-06 PROCEDURE — 43239 EGD BIOPSY SINGLE/MULTIPLE: CPT | Performed by: INTERNAL MEDICINE

## 2023-04-07 ENCOUNTER — LAB REQUISITION (OUTPATIENT)
Dept: LAB | Facility: HOSPITAL | Age: 58
End: 2023-04-07
Payer: COMMERCIAL

## 2023-04-07 DIAGNOSIS — K21.9 GASTRO-ESOPHAGEAL REFLUX DISEASE WITHOUT ESOPHAGITIS: ICD-10-CM

## 2023-04-07 DIAGNOSIS — R05.3 CHRONIC COUGH: ICD-10-CM

## 2023-04-10 ENCOUNTER — TELEPHONE (OUTPATIENT)
Dept: GASTROENTEROLOGY | Facility: CLINIC | Age: 58
End: 2023-04-10

## 2023-04-10 LAB
CYTO UR: NORMAL
LAB AP CASE REPORT: NORMAL
LAB AP CLINICAL INFORMATION: NORMAL
PATH REPORT.FINAL DX SPEC: NORMAL
PATH REPORT.GROSS SPEC: NORMAL

## 2023-04-10 NOTE — TELEPHONE ENCOUNTER
PT CALLED THIS AM STATING SHE CALLED THE OFFICE SEVERAL TIMES LAST WEEK AND EARLIER THIS MORNING LEAVING A VM CONCERNING A PRESCRIPTION THAT WAS TO BE SENT LAST WEEK AFTER HER PROCEDURE. PATIENT STATES HER PHARMACY HAS NO RECORD OF THE PRESCRIPTION AND SHE IS CONFUSED ABOUT WHAT IS GOING ON AND WHY THIS PROCESS IS TAKING SO LONG. PATIENT WOULD LIKE A CALL BACK WITH DETAILS CONCERNING THE MEDICATION PLEASE.

## 2023-04-11 RX ORDER — BACLOFEN 10 MG/1
10 TABLET ORAL 3 TIMES DAILY
Qty: 90 TABLET | Refills: 2 | Status: SHIPPED | OUTPATIENT
Start: 2023-04-11

## 2023-04-11 RX ORDER — ESOMEPRAZOLE MAGNESIUM 40 MG/1
40 CAPSULE, DELAYED RELEASE ORAL 2 TIMES DAILY
Qty: 180 CAPSULE | Refills: 3 | Status: CANCELLED | OUTPATIENT
Start: 2023-04-11

## 2023-04-11 RX ORDER — ESOMEPRAZOLE MAGNESIUM 40 MG/1
40 CAPSULE, DELAYED RELEASE ORAL 2 TIMES DAILY
Qty: 180 CAPSULE | Refills: 3 | Status: SHIPPED | OUTPATIENT
Start: 2023-04-11

## 2023-04-11 RX ORDER — BACLOFEN 10 MG/1
10 TABLET ORAL 3 TIMES DAILY
Qty: 90 TABLET | Refills: 1 | Status: CANCELLED | OUTPATIENT
Start: 2023-04-11

## 2023-07-13 ENCOUNTER — TELEPHONE (OUTPATIENT)
Dept: GASTROENTEROLOGY | Facility: CLINIC | Age: 58
End: 2023-07-13

## 2023-07-13 NOTE — TELEPHONE ENCOUNTER
Caller: Dirk Valdez    Relationship to patient: Self     Best call back number: [phone]  Patient is needing: PA FOR ESOMEPRAZOLE [facility] TOLD PATIENT TO HAVE OFFICE CALL TO HELP WITH THE PA FOR MEDICATION. PATIENT ONLY HAS MEDICATION TO LAST UNTIL 07/14/2023.

## 2023-08-08 ENCOUNTER — OFFICE VISIT (OUTPATIENT)
Dept: PULMONOLOGY | Facility: CLINIC | Age: 58
End: 2023-08-08
Payer: COMMERCIAL

## 2023-08-08 ENCOUNTER — TELEPHONE (OUTPATIENT)
Dept: PULMONOLOGY | Facility: CLINIC | Age: 58
End: 2023-08-08

## 2023-08-08 VITALS
HEART RATE: 80 BPM | OXYGEN SATURATION: 97 % | WEIGHT: 149.4 LBS | BODY MASS INDEX: 26.47 KG/M2 | SYSTOLIC BLOOD PRESSURE: 124 MMHG | TEMPERATURE: 97.6 F | DIASTOLIC BLOOD PRESSURE: 70 MMHG | HEIGHT: 63 IN

## 2023-08-08 DIAGNOSIS — J47.9 BRONCHIECTASIS WITHOUT ACUTE EXACERBATION: ICD-10-CM

## 2023-08-08 DIAGNOSIS — J30.89 SEASONAL AND PERENNIAL ALLERGIC RHINITIS: ICD-10-CM

## 2023-08-08 DIAGNOSIS — J30.2 SEASONAL AND PERENNIAL ALLERGIC RHINITIS: ICD-10-CM

## 2023-08-08 DIAGNOSIS — R05.3 CHRONIC COUGH: Primary | ICD-10-CM

## 2023-08-08 DIAGNOSIS — R91.8 MULTIPLE PULMONARY NODULES: ICD-10-CM

## 2023-08-08 DIAGNOSIS — K21.9 CHRONIC GERD: ICD-10-CM

## 2023-08-08 PROCEDURE — 99214 OFFICE O/P EST MOD 30 MIN: CPT | Performed by: NURSE PRACTITIONER

## 2023-08-08 RX ORDER — ALBUTEROL SULFATE 90 UG/1
2 AEROSOL, METERED RESPIRATORY (INHALATION) EVERY 4 HOURS PRN
Qty: 18 G | Refills: 5 | Status: SHIPPED | OUTPATIENT
Start: 2023-08-08

## 2023-08-08 NOTE — TELEPHONE ENCOUNTER
Pt called stating that Rx Albuterol HFA will be needing a PA. Spoke with pharmacy Rx ProAir HFA will be covered per pharmacy and will fill. Pt notified and verbalized appreciation.

## 2023-08-08 NOTE — PROGRESS NOTES
[facility], [place] Pulmonary Follow up    CHIEF COMPLAINT    Cough    HISTORY OF PRESENT ILLNESS    Dirk Valdez is a 57 y.o.female here today for follow-up.  She was last seen in the office me in February.  She denies any respiratory illnesses since her last appointment.    She continues to have difficulty with her cough.  She states that she has done well with the Advair 100 twice a day and the Spiriva daily.  She is also using Mucinex daily.  She also has the flutter valve to use twice a day.    She typically does not produce sputum.  She will have a nonproductive dry cough.    She denies any reflux symptoms.  She does take Nexium regularly.    She occasionally has some sinus symptoms.  She does take Xyzal regularly.    She denies any chest pain or palpitations.  She denies any lower extremity edema or calf tenderness.    She does have issues with coughing during exercise.  She states that she will get on the treadmill and then have a coughing spell and have to quit exercising.  She does not have a rescue inhaler on hand.    She quit smoking in 1993.  She has a 6-pack-year history.    She had a CT scan performed in January that showed multiple pulmonary nodules that were no larger than 4 mm in size.  I did recommend a repeat CT scan in 1 year.    Patient Active Problem List   Diagnosis    Chronic cough    GERD    Seasonal and perennial allergic rhinitis    Remote smoker    Hiatal hernia    Bronchiectasis without acute exacerbation       No Known Allergies    Current Outpatient Medications:     Aimovig 140 MG/ML auto-injector, , Disp: , Rfl:     azelastine (ASTELIN) 0.1 % nasal spray, USE 1 TO 2 SPRAYS IN EACH NOSTRIL TWICE DAILY AS NEEDED, Disp: , Rfl:     baclofen (LIORESAL) 10 MG tablet, Take 1 tablet by mouth 3 (Three) Times a Day As Needed for Muscle Spasms. And/or Chronic cough, Disp: 270 tablet, Rfl: 3    Biotin 5 MG capsule, 2 (Two) Times a Day., Disp: , Rfl:     Cholecalciferol (Vitamin D3) 1.25 MG (73677 UT)  capsule, Daily., Disp: , Rfl:     Chromium Picolinate (CHROMIUM PICOLATE PO), Take  by mouth., Disp: , Rfl:     Coenzyme Q10 (Co Q 10) 100 MG capsule, Daily., Disp: , Rfl:     Cranberry 300 MG tablet, See Admin Instructions., Disp: , Rfl:     esomeprazole (nexIUM) 40 MG capsule, Take 1 capsule by mouth 2 (Two) Times a Day., Disp: 180 capsule, Rfl: 3    esomeprazole (nexIUM) 40 MG capsule, Take 1 capsule by mouth 2 (Two) Times a Day., Disp: 180 capsule, Rfl: 3    estradiol (ESTRACE) 0.5 MG tablet, Take 1 tablet by mouth Daily., Disp: , Rfl:     Fluticasone-Salmeterol (ADVAIR/WIXELA) 100-50 MCG/ACT DISKUS, Inhale 1 puff 2 (Two) Times a Day., Disp: 60 each, Rfl: 5    guaiFENesin (Mucinex) 600 MG 12 hr tablet, Take 1 tablet by mouth 2 (Two) Times a Day., Disp: 180 tablet, Rfl: 3    levocetirizine (XYZAL) 5 MG tablet, Take 1 tablet by mouth Daily., Disp: , Rfl:     Magnesium Oxide 400 MG capsule, See Admin Instructions., Disp: , Rfl:     metroNIDAZOLE (METROCREAM) 0.75 % cream, APPLY EXTERNALLY TO THE AFFECTED AREA TWICE DAILY AS DIRECTED, Disp: , Rfl:     Omega 3-6-9 Fatty Acids (OMEGA-3-6-9 PO), Take  by mouth., Disp: , Rfl:     Progesterone (PROMETRIUM) 100 MG capsule, Take 1 capsule by mouth every night at bedtime., Disp: , Rfl:     Spiriva Respimat 2.5 MCG/ACT aerosol solution inhaler, Inhale 2 puffs Daily., Disp: , Rfl:     tretinoin (RETIN-A) 0.1 % cream, APPLY A PEA-SIZED AMOUNT TO AFFECTED AREA(S) ON FACE AND NECK EVERY OTHER NIGHT, Disp: , Rfl:     Turmeric (QC TUMERIC COMPLEX PO), Take  by mouth., Disp: , Rfl:     vitamin B-12 (CYANOCOBALAMIN) 1000 MCG tablet, Take 1 tablet by mouth Daily., Disp: , Rfl:     vitamin C (ASCORBIC ACID) 250 MG tablet, Take 1 tablet by mouth Daily., Disp: , Rfl:     Zinc 50 MG capsule, Daily., Disp: , Rfl:     albuterol sulfate  (90 Base) MCG/ACT inhaler, Inhale 2 puffs Every 4 (Four) Hours As Needed for Wheezing., Disp: 18 g, Rfl: 5  MEDICATION LIST AND ALLERGIES  "REVIEWED.    Social History     Tobacco Use    Smoking status: Former     Packs/day: 0.50     Years: 12.00     Pack years: 6.00     Types: Cigarettes     Quit date:      Years since quittin.6    Smokeless tobacco: Never   Vaping Use    Vaping Use: Never used   Substance Use Topics    Alcohol use: Not Currently     Comment: socially    Drug use: Never       FAMILY AND SOCIAL HISTORY REVIEWED.    Review of Systems   Constitutional:  Negative for activity change, appetite change, fatigue, fever and unexpected weight change.   HENT:  Negative for congestion, postnasal drip, rhinorrhea, sinus pressure, sore throat and voice change.    Eyes:  Negative for visual disturbance.   Respiratory:  Positive for cough. Negative for chest tightness, shortness of breath and wheezing.    Cardiovascular:  Negative for chest pain, palpitations and leg swelling.   Gastrointestinal:  Negative for abdominal distention, abdominal pain, nausea and vomiting.   Endocrine: Negative for cold intolerance and heat intolerance.   Genitourinary:  Negative for difficulty urinating and urgency.   Musculoskeletal:  Negative for arthralgias, back pain and neck pain.   Skin:  Negative for color change and pallor.   Allergic/Immunologic: Negative for environmental allergies and food allergies.   Neurological:  Negative for dizziness, syncope, weakness and light-headedness.   Hematological:  Negative for adenopathy. Does not bruise/bleed easily.   Psychiatric/Behavioral:  Negative for agitation and behavioral problems.  .    /70   Pulse 80   Temp 97.6 øF (36.4 øC)   Ht 160 cm (63\")   Wt 67.8 kg (149 lb 6.4 oz)   SpO2 97% Comment: resting, room air  BMI 26.47 kg/mý     Immunization History   Administered Date(s) Administered    COVID-19 (PFIZER) Purple Cap Monovalent 2021, 2021, 10/01/2021    Flu Vaccine Split Quad 10/16/2009, 10/08/2010, 10/12/2012, 2015, 2016, 10/05/2017, 2019, 2020    " FluLaval/Fluzone >6mos 09/17/2020, 10/01/2021    Hepatitis A 01/20/2018    Influenza Injectable Mdck Pf Quad 09/17/2020, 10/01/2021    Influenza Seasonal Injectable 09/29/2019, 09/17/2020    Influenza, Unspecified 10/03/2022    MMR 09/29/2019    Pneumococcal Conjugate 13-Valent (PCV13) 11/17/2015    Pneumococcal Conjugate 20-Valent (PCV20) 10/03/2022    Shingrix 10/01/2018    Tdap 11/17/2015, 01/15/2019, 09/29/2019    Varicella 04/14/2018    Zostavax 11/17/2015, 04/14/2018, 08/17/2018       Physical Exam  Vitals and nursing note reviewed.   Constitutional:       Appearance: She is well-developed. She is not diaphoretic.   HENT:      Head: Normocephalic and atraumatic.   Eyes:      Pupils: Pupils are equal, round, and reactive to light.   Neck:      Thyroid: No thyromegaly.   Cardiovascular:      Rate and Rhythm: Normal rate and regular rhythm.      Heart sounds: Normal heart sounds. No murmur heard.    No friction rub. No gallop.   Pulmonary:      Effort: Pulmonary effort is normal. No respiratory distress.      Breath sounds: Normal breath sounds. No wheezing or rales.   Chest:      Chest wall: No tenderness.   Abdominal:      General: Bowel sounds are normal.      Palpations: Abdomen is soft.      Tenderness: There is no abdominal tenderness.   Musculoskeletal:         General: No swelling. Normal range of motion.      Cervical back: Normal range of motion and neck supple.   Lymphadenopathy:      Cervical: No cervical adenopathy.   Skin:     General: Skin is warm and dry.      Capillary Refill: Capillary refill takes less than 2 seconds.   Neurological:      Mental Status: She is alert and oriented to person, place, and time.   Psychiatric:         Mood and Affect: Mood normal.         Behavior: Behavior normal.         RESULTS      PROBLEM LIST    Problem List Items Addressed This Visit          Allergies and Adverse Reactions    Seasonal and perennial allergic rhinitis       Gastrointestinal Abdominal     GERD        Pulmonary and Pneumonias    Chronic cough - Primary    Relevant Medications    albuterol sulfate  (90 Base) MCG/ACT inhaler    Other Relevant Orders    CT Chest Without Contrast    Bronchiectasis without acute exacerbation    Relevant Medications    albuterol sulfate  (90 Base) MCG/ACT inhaler     Other Visit Diagnoses       Multiple pulmonary nodules        Relevant Medications    albuterol sulfate  (90 Base) MCG/ACT inhaler    Other Relevant Orders    CT Chest Without Contrast              DISCUSSION    Ms. Valdez was here for follow-up.  She seems to doing fairly well from her bronchiectasis.  I did advise her to continue the Advair twice a day.  I did advise her that she could stop the Spiriva to see if she noticed any difference in her cough.  If she notices no difference she can stay off the Spiriva.    She will continue the flutter valve twice a day for her bronchiectasis.  She does feel like this helps her.  She will also continue the Mucinex daily for her bronchiectasis.    I will send in an albuterol rescue inhaler for her to use prior to heavy exertion and exercise to see if it helps with her cough.    She will continue Nexium twice a day for her GERD.  We also discussed strict reflux precautions in the office today.    We did review her CT scan that was performed in January that showed multiple pulmonary nodules.  We will repeat a CT scan to follow-up on these and will have this performed in January 2024.  Her mother passed away from lung cancer.    She will follow-up in January after her repeat CT scan.    I personally spent a total of 33 minutes on patient visit today including chart review, face to face with the patient obtaining the history and physical exam, review of pertinent images and tests, counseling and discussion and/or coordination of care as described above, and documentation.  Total time excludes time spent on other separate services such as performing  procedures or test interpretation, if applicable.        Alecia Radford, THERESA  08/08/202311:27 EDT  Electronically signed     Please note that portions of this note were completed with a voice recognition program.        CC: Charlette Gillis MD

## 2023-08-09 DIAGNOSIS — R05.3 CHRONIC COUGH: ICD-10-CM

## 2023-08-09 RX ORDER — FLUTICASONE PROPIONATE AND SALMETEROL 100; 50 UG/1; UG/1
1 POWDER RESPIRATORY (INHALATION)
Qty: 60 EACH | Refills: 5 | Status: SHIPPED | OUTPATIENT
Start: 2023-08-09

## 2023-11-16 ENCOUNTER — TELEPHONE (OUTPATIENT)
Dept: PULMONOLOGY | Facility: CLINIC | Age: 58
End: 2023-11-16

## 2023-11-16 NOTE — TELEPHONE ENCOUNTER
[place] called to give Fax number for the place this pt wants to go for a Referral from dr read for CT scan of chest    [facility]  Fax: [phone]

## 2023-12-07 ENCOUNTER — TRANSCRIBE ORDERS (OUTPATIENT)
Dept: PULMONOLOGY | Facility: CLINIC | Age: 58
End: 2023-12-07
Payer: COMMERCIAL

## 2024-01-02 DIAGNOSIS — R91.8 MULTIPLE PULMONARY NODULES: ICD-10-CM

## 2024-01-02 DIAGNOSIS — R05.3 CHRONIC COUGH: ICD-10-CM

## 2024-02-01 ENCOUNTER — OFFICE VISIT (OUTPATIENT)
Dept: PULMONOLOGY | Facility: CLINIC | Age: 59
End: 2024-02-01
Payer: COMMERCIAL

## 2024-02-01 VITALS
BODY MASS INDEX: 26.05 KG/M2 | SYSTOLIC BLOOD PRESSURE: 118 MMHG | DIASTOLIC BLOOD PRESSURE: 60 MMHG | OXYGEN SATURATION: 98 % | HEIGHT: 63 IN | HEART RATE: 60 BPM | WEIGHT: 147 LBS

## 2024-02-01 DIAGNOSIS — R05.3 CHRONIC COUGH: Primary | ICD-10-CM

## 2024-02-01 DIAGNOSIS — K21.9 CHRONIC GERD: ICD-10-CM

## 2024-02-01 RX ORDER — ATOGEPANT 60 MG/1
1 TABLET ORAL DAILY
COMMUNITY
Start: 2024-01-23

## 2024-02-01 NOTE — PROGRESS NOTES
"  PULMONARY  NOTE    Chief Complaint     Cough, remote smoker, hiatal hernia, perennial rhinitis    History of Present Illness     58-year-old female returns today for follow-up  I last saw her 12/20/2022  She was last seen by Michelle Cali on 8/8/2023  Previously seen by Dr. Hoffmann over 10 years ago    She has had a chronic cough that has been going on for over 10 years  Her cough is never resolved  It waxes and wanes  It will wake her from sleep at night  It is aggravated by eating, talking, or exercise  Weather changes precipitate cough, as well    When she starts coughing she will produce \"tons of mucus\"  She is never had hemoptysis    Nothing is ever been identified that has seemed to help with her cough    Past workup has included multiple PFTs which have been normal  Abnormal methacholine challenge  Unremarkable chest imaging as noted below  And prior bronchoscopy  She has never had airway obstruction on spirometry and a negative methacholine challenge as well as lack of clinical response to multiple inhalers in the past    She has seen allergy and immunology with no specific etiology identified    Her cough preceded use of progesterone    She has had a GI evaluation which is included an EGD and 24-hour pH monitor  The 24-hour pH monitor was felt to be borderline  Reflux precaution compliance is not felt to have impacted her cough    Recently she had chest imaging where the radiologist opined that she had traction bronchiectasis based on a CT scan of the chest done at the [facility] in January 2023  Follow-up CT scan of the chest done at [facility] on 12/28/2023 done at [facility] failed to show significant bronchiectasis, interstitial lung disease and only a few small pulmonary nodules, max 4 mm in size    Patient Active Problem List   Diagnosis    Chronic cough    GERD    Seasonal and perennial allergic rhinitis    Remote smoker    Hiatal hernia      No " Known Allergies    Current Outpatient Medications:     albuterol sulfate  (90 Base) MCG/ACT inhaler, Inhale 2 puffs Every 4 (Four) Hours As Needed for Wheezing., Disp: 18 g, Rfl: 5    azelastine (ASTELIN) 0.1 % nasal spray, USE 1 TO 2 SPRAYS IN EACH NOSTRIL TWICE DAILY AS NEEDED, Disp: , Rfl:     baclofen (LIORESAL) 10 MG tablet, Take 1 tablet by mouth 3 (Three) Times a Day As Needed for Muscle Spasms. And/or Chronic cough, Disp: 270 tablet, Rfl: 3    Biotin 5 MG capsule, 2 (Two) Times a Day., Disp: , Rfl:     Cholecalciferol (Vitamin D3) 1.25 MG (97579 UT) capsule, Daily., Disp: , Rfl:     Chromium Picolinate (CHROMIUM PICOLATE PO), Take  by mouth., Disp: , Rfl:     Coenzyme Q10 (Co Q 10) 100 MG capsule, Daily., Disp: , Rfl:     Cranberry 300 MG tablet, See Admin Instructions., Disp: , Rfl:     esomeprazole (nexIUM) 40 MG capsule, Take 1 capsule by mouth 2 (Two) Times a Day., Disp: 180 capsule, Rfl: 3    esomeprazole (nexIUM) 40 MG capsule, Take 1 capsule by mouth 2 (Two) Times a Day., Disp: 180 capsule, Rfl: 3    estradiol (ESTRACE) 0.5 MG tablet, Take 1 tablet by mouth Daily., Disp: , Rfl:     Fluticasone-Salmeterol (ADVAIR/WIXELA) 100-50 MCG/ACT DISKUS, Inhale 1 puff 2 (Two) Times a Day., Disp: 60 each, Rfl: 5    guaiFENesin (Mucinex) 600 MG 12 hr tablet, Take 1 tablet by mouth 2 (Two) Times a Day., Disp: 180 tablet, Rfl: 3    levocetirizine (XYZAL) 5 MG tablet, Take 1 tablet by mouth Daily., Disp: , Rfl:     Magnesium Oxide 400 MG capsule, See Admin Instructions., Disp: , Rfl:     metroNIDAZOLE (METROCREAM) 0.75 % cream, APPLY EXTERNALLY TO THE AFFECTED AREA TWICE DAILY AS DIRECTED, Disp: , Rfl:     Omega 3-6-9 Fatty Acids (OMEGA-3-6-9 PO), Take  by mouth., Disp: , Rfl:     Progesterone (PROMETRIUM) 100 MG capsule, Take 1 capsule by mouth every night at bedtime., Disp: , Rfl:     Qulipta 60 MG tablet, Take 1 tablet by mouth Daily., Disp: , Rfl:     Spiriva Respimat 2.5 MCG/ACT aerosol solution inhaler,  "Inhale 2 puffs Daily., Disp: , Rfl:     tretinoin (RETIN-A) 0.1 % cream, APPLY A PEA-SIZED AMOUNT TO AFFECTED AREA(S) ON FACE AND NECK EVERY OTHER NIGHT, Disp: , Rfl:     Turmeric (QC TUMERIC COMPLEX PO), Take  by mouth., Disp: , Rfl:     vitamin B-12 (CYANOCOBALAMIN) 1000 MCG tablet, Take 1 tablet by mouth Daily., Disp: , Rfl:     vitamin C (ASCORBIC ACID) 250 MG tablet, Take 1 tablet by mouth Daily., Disp: , Rfl:     Zinc 50 MG capsule, Daily., Disp: , Rfl:   MEDICATION LIST AND ALLERGIES REVIEWED.    Family History   Problem Relation Age of Onset    Stroke Mother     Cancer Mother     Pulmonary fibrosis Father     Hypertension Father     Alcohol abuse Sister      Social History     Tobacco Use    Smoking status: Former     Packs/day: 0.50     Years: 12.00     Additional pack years: 0.00     Total pack years: 6.00     Types: Cigarettes     Quit date:      Years since quittin.1    Smokeless tobacco: Never   Vaping Use    Vaping Use: Never used   Substance Use Topics    Alcohol use: Not Currently     Comment: socially    Drug use: Never     Social History     Social History Narrative        Smoked for about 15 years, none since     Drinks approximately 3 alcoholic beverages on a weekly basis     FAMILY AND SOCIAL HISTORY REVIEWED.    Review of Systems  IF PRESENT REFER TO SCANNED ROS SHEET FROM SAME DATE  OTHERWISE ROS OBTAINED AND NON-CONTRIBUTORY OVER HPI.    /60   Pulse 60   Ht 160 cm (63\")   Wt 66.7 kg (147 lb)   SpO2 98% Comment: resting, room air  BMI 26.04 kg/m²   Physical Exam  Vitals and nursing note reviewed.   Constitutional:       General: She is not in acute distress.     Appearance: She is well-developed. She is not diaphoretic.   HENT:      Head: Normocephalic and atraumatic.   Neck:      Thyroid: No thyromegaly.   Cardiovascular:      Rate and Rhythm: Normal rate and regular rhythm.      Heart sounds: Normal heart sounds. No murmur heard.  Pulmonary:      Effort: " Pulmonary effort is normal.      Breath sounds: Normal breath sounds. No stridor.   Lymphadenopathy:      Cervical: No cervical adenopathy.      Upper Body:      Right upper body: No supraclavicular or epitrochlear adenopathy.      Left upper body: No supraclavicular or epitrochlear adenopathy.   Skin:     General: Skin is warm and dry.   Neurological:      Mental Status: She is alert and oriented to person, place, and time.   Psychiatric:         Behavior: Behavior normal.         Results     CT scan of the chest done at [facility] from 12/28/2023 reviewed on PACS as well as CT scan of the chest with [facility] from 1/7/2023 reviewed on PACS  Few bilateral pulmonary nodules, max 4 mm in size  I do not identify significant bronchiectasis or evidence of interstitial lung disease    Immunization History   Administered Date(s) Administered    COVID-19 (PFIZER) Purple Cap Monovalent 01/19/2021, 02/12/2021, 10/01/2021    Flu Vaccine Split Quad 10/16/2009, 10/08/2010, 10/12/2012, 11/03/2015, 09/30/2016, 10/05/2017, 09/29/2019, 09/17/2020, 10/12/2023    Fluzone (or Fluarix & Flulaval for VFC) >6mos 09/17/2020, 10/01/2021    Hepatitis A 01/20/2018    Influenza Injectable Mdck Pf Quad 09/17/2020, 10/01/2021    Influenza Seasonal Injectable 09/29/2019, 09/17/2020    Influenza, Unspecified 10/03/2022    MMR 09/29/2019    Pneumococcal Conjugate 13-Valent (PCV13) 11/17/2015    Pneumococcal Conjugate 20-Valent (PCV20) 10/03/2022    Shingrix 10/01/2018    Tdap 11/17/2015, 01/15/2019, 09/29/2019    Varicella 04/14/2018    Zostavax 11/17/2015, 04/14/2018, 08/17/2018     Problem List       ICD-10-CM ICD-9-CM   1. Chronic cough  R05.3 786.2   2. GERD  K21.9 530.81       Discussion     We reviewed her previous cough workup  This is a workup that has extended back over 10 years  Her workup has included multiple PFTs, negative methacholine challenge, multiple chest imaging, and bronchoscopy  She has seen  "allergy and immunology and immunotherapy was discussed but not pursued    She has had a GI evaluation  This is included an EGD and 24-hour pH monitor  The 24-hour pH monitor was considered \"borderline\" although I do not actually have that report    I have recommended reflux precautions  Particularly, given the association of eating and coughing and the nocturnal coughing  This apparently has not resulted in any improvement in her cough    At this point, I do not think that bronchiectasis is playing a role in her cough  She may be has 1 or 2 bronchiectatic segments but that is about it  I again reassured her she does not have \"emphysema\" the disease  Prior PFTs have been normal.    At this point, other than repeating previously performed tests (such as a bronchoscopy or 24-hour pH monitor) I am not sure what else to do from pulmonary standpoint  I am reluctant to start her on a chronic cough suppressant such as a narcotic or gabapentin    I have recommended referral to a tertiary care center  We talked about the [facility] or [place] [facility] but she does not feel that she has the resources to go there  Have offered to refer her to the [facility] or [facility], as well    We discussed her pulmonary nodules and management guidelines  For this low risk individual with 4 mm pulmonary nodules no further evaluation is needed in this low risk individual    She will consider and let us know    Moderate level of Medical Decision Making complexity based on 1 undiagnosed new problem or 2 stable chronic conditions, independent interpretation of tests, and/or prescription drug management     Urbano Sullivan MD  Note electronically signed    CC: Charlette Gillis MD  "

## 2024-03-27 DIAGNOSIS — R05.3 CHRONIC COUGH: ICD-10-CM

## 2024-03-27 DIAGNOSIS — R91.8 MULTIPLE PULMONARY NODULES: Primary | ICD-10-CM

## 2024-05-20 ENCOUNTER — TRANSCRIBE ORDERS (OUTPATIENT)
Dept: LAB | Facility: HOSPITAL | Age: 59
End: 2024-05-20
Payer: COMMERCIAL

## 2024-05-20 ENCOUNTER — LAB (OUTPATIENT)
Dept: LAB | Facility: HOSPITAL | Age: 59
End: 2024-05-20
Payer: COMMERCIAL

## 2024-05-20 DIAGNOSIS — Z13.228 MUCOVISCIDOSIS SCREENING: ICD-10-CM

## 2024-05-20 DIAGNOSIS — Z13.228 MUCOVISCIDOSIS SCREENING: Primary | ICD-10-CM

## 2024-05-20 LAB — 25(OH)D3 SERPL-MCNC: 81.4 NG/ML (ref 30–100)

## 2024-05-20 PROCEDURE — 83036 HEMOGLOBIN GLYCOSYLATED A1C: CPT

## 2024-05-20 PROCEDURE — 80061 LIPID PANEL: CPT

## 2024-05-20 PROCEDURE — 80050 GENERAL HEALTH PANEL: CPT

## 2024-05-20 PROCEDURE — 36415 COLL VENOUS BLD VENIPUNCTURE: CPT

## 2024-05-20 PROCEDURE — 82306 VITAMIN D 25 HYDROXY: CPT

## 2024-05-21 LAB
ALBUMIN SERPL-MCNC: 4.1 G/DL (ref 3.5–5.2)
ALBUMIN/GLOB SERPL: 1.7 G/DL
ALP SERPL-CCNC: 93 U/L (ref 39–117)
ALT SERPL W P-5'-P-CCNC: 18 U/L (ref 1–33)
ANION GAP SERPL CALCULATED.3IONS-SCNC: 9.2 MMOL/L (ref 5–15)
AST SERPL-CCNC: 23 U/L (ref 1–32)
BASOPHILS # BLD AUTO: 0.06 10*3/MM3 (ref 0–0.2)
BASOPHILS NFR BLD AUTO: 1 % (ref 0–1.5)
BILIRUB SERPL-MCNC: 0.6 MG/DL (ref 0–1.2)
BUN SERPL-MCNC: 8 MG/DL (ref 6–20)
BUN/CREAT SERPL: 10.7 (ref 7–25)
CALCIUM SPEC-SCNC: 8.9 MG/DL (ref 8.6–10.5)
CHLORIDE SERPL-SCNC: 104 MMOL/L (ref 98–107)
CHOLEST SERPL-MCNC: 143 MG/DL (ref 0–200)
CO2 SERPL-SCNC: 24.8 MMOL/L (ref 22–29)
CREAT SERPL-MCNC: 0.75 MG/DL (ref 0.57–1)
DEPRECATED RDW RBC AUTO: 39.1 FL (ref 37–54)
EGFRCR SERPLBLD CKD-EPI 2021: 91.8 ML/MIN/1.73
EOSINOPHIL # BLD AUTO: 0.11 10*3/MM3 (ref 0–0.4)
EOSINOPHIL NFR BLD AUTO: 1.9 % (ref 0.3–6.2)
ERYTHROCYTE [DISTWIDTH] IN BLOOD BY AUTOMATED COUNT: 11.6 % (ref 12.3–15.4)
GLOBULIN UR ELPH-MCNC: 2.4 GM/DL
GLUCOSE SERPL-MCNC: 87 MG/DL (ref 65–99)
HBA1C MFR BLD: 5 % (ref 4.8–5.6)
HCT VFR BLD AUTO: 43.2 % (ref 34–46.6)
HDLC SERPL-MCNC: 50 MG/DL (ref 40–60)
HGB BLD-MCNC: 14.8 G/DL (ref 12–15.9)
IMM GRANULOCYTES # BLD AUTO: 0.01 10*3/MM3 (ref 0–0.05)
IMM GRANULOCYTES NFR BLD AUTO: 0.2 % (ref 0–0.5)
LDLC SERPL CALC-MCNC: 80 MG/DL (ref 0–100)
LDLC/HDLC SERPL: 1.6 {RATIO}
LYMPHOCYTES # BLD AUTO: 1.85 10*3/MM3 (ref 0.7–3.1)
LYMPHOCYTES NFR BLD AUTO: 32.1 % (ref 19.6–45.3)
MCH RBC QN AUTO: 31.8 PG (ref 26.6–33)
MCHC RBC AUTO-ENTMCNC: 34.3 G/DL (ref 31.5–35.7)
MCV RBC AUTO: 92.7 FL (ref 79–97)
MONOCYTES # BLD AUTO: 0.43 10*3/MM3 (ref 0.1–0.9)
MONOCYTES NFR BLD AUTO: 7.5 % (ref 5–12)
NEUTROPHILS NFR BLD AUTO: 3.31 10*3/MM3 (ref 1.7–7)
NEUTROPHILS NFR BLD AUTO: 57.3 % (ref 42.7–76)
NRBC BLD AUTO-RTO: 0 /100 WBC (ref 0–0.2)
PLATELET # BLD AUTO: 208 10*3/MM3 (ref 140–450)
PMV BLD AUTO: 10 FL (ref 6–12)
POTASSIUM SERPL-SCNC: 4.3 MMOL/L (ref 3.5–5.2)
PROT SERPL-MCNC: 6.5 G/DL (ref 6–8.5)
RBC # BLD AUTO: 4.66 10*6/MM3 (ref 3.77–5.28)
SODIUM SERPL-SCNC: 138 MMOL/L (ref 136–145)
TRIGL SERPL-MCNC: 64 MG/DL (ref 0–150)
TSH SERPL DL<=0.05 MIU/L-ACNC: 1.78 UIU/ML (ref 0.27–4.2)
VLDLC SERPL-MCNC: 13 MG/DL (ref 5–40)
WBC NRBC COR # BLD AUTO: 5.77 10*3/MM3 (ref 3.4–10.8)

## 2025-04-11 ENCOUNTER — TRANSCRIBE ORDERS (OUTPATIENT)
Dept: LAB | Facility: HOSPITAL | Age: 60
End: 2025-04-11
Payer: COMMERCIAL

## 2025-04-11 ENCOUNTER — LAB (OUTPATIENT)
Dept: LAB | Facility: HOSPITAL | Age: 60
End: 2025-04-11
Payer: COMMERCIAL

## 2025-04-11 DIAGNOSIS — Z13.228 SCREENING FOR PHENYLKETONURIA (PKU): ICD-10-CM

## 2025-04-11 DIAGNOSIS — Z13.228 SCREENING FOR PHENYLKETONURIA (PKU): Primary | ICD-10-CM

## 2025-04-11 LAB
25(OH)D3 SERPL-MCNC: 96.6 NG/ML (ref 30–100)
ALBUMIN SERPL-MCNC: 4.1 G/DL (ref 3.5–5.2)
ALBUMIN/GLOB SERPL: 1.4 G/DL
ALP SERPL-CCNC: 110 U/L (ref 39–117)
ALT SERPL W P-5'-P-CCNC: 16 U/L (ref 1–33)
ANION GAP SERPL CALCULATED.3IONS-SCNC: 9.7 MMOL/L (ref 5–15)
AST SERPL-CCNC: 29 U/L (ref 1–32)
BASOPHILS # BLD AUTO: 0.06 10*3/MM3 (ref 0–0.2)
BASOPHILS NFR BLD AUTO: 1.2 % (ref 0–1.5)
BILIRUB SERPL-MCNC: 0.5 MG/DL (ref 0–1.2)
BUN SERPL-MCNC: 9 MG/DL (ref 8–23)
BUN/CREAT SERPL: 11.3 (ref 7–25)
CALCIUM SPEC-SCNC: 9.3 MG/DL (ref 8.6–10.5)
CHLORIDE SERPL-SCNC: 103 MMOL/L (ref 98–107)
CHOLEST SERPL-MCNC: 169 MG/DL (ref 0–200)
CO2 SERPL-SCNC: 26.3 MMOL/L (ref 22–29)
CREAT SERPL-MCNC: 0.8 MG/DL (ref 0.57–1)
DEPRECATED RDW RBC AUTO: 38 FL (ref 37–54)
EGFRCR SERPLBLD CKD-EPI 2021: 84.5 ML/MIN/1.73
EOSINOPHIL # BLD AUTO: 0.04 10*3/MM3 (ref 0–0.4)
EOSINOPHIL NFR BLD AUTO: 0.8 % (ref 0.3–6.2)
ERYTHROCYTE [DISTWIDTH] IN BLOOD BY AUTOMATED COUNT: 11.4 % (ref 12.3–15.4)
GLOBULIN UR ELPH-MCNC: 3 GM/DL
GLUCOSE SERPL-MCNC: 91 MG/DL (ref 65–99)
HBA1C MFR BLD: 5 % (ref 4.8–5.6)
HCT VFR BLD AUTO: 43.5 % (ref 34–46.6)
HDLC SERPL-MCNC: 60 MG/DL (ref 40–60)
HGB BLD-MCNC: 15.1 G/DL (ref 12–15.9)
IMM GRANULOCYTES # BLD AUTO: 0.02 10*3/MM3 (ref 0–0.05)
IMM GRANULOCYTES NFR BLD AUTO: 0.4 % (ref 0–0.5)
LDLC SERPL CALC-MCNC: 98 MG/DL (ref 0–100)
LDLC/HDLC SERPL: 1.63 {RATIO}
LYMPHOCYTES # BLD AUTO: 1.49 10*3/MM3 (ref 0.7–3.1)
LYMPHOCYTES NFR BLD AUTO: 29.7 % (ref 19.6–45.3)
MCH RBC QN AUTO: 31.5 PG (ref 26.6–33)
MCHC RBC AUTO-ENTMCNC: 34.7 G/DL (ref 31.5–35.7)
MCV RBC AUTO: 90.8 FL (ref 79–97)
MONOCYTES # BLD AUTO: 0.36 10*3/MM3 (ref 0.1–0.9)
MONOCYTES NFR BLD AUTO: 7.2 % (ref 5–12)
NEUTROPHILS NFR BLD AUTO: 3.04 10*3/MM3 (ref 1.7–7)
NEUTROPHILS NFR BLD AUTO: 60.7 % (ref 42.7–76)
NRBC BLD AUTO-RTO: 0 /100 WBC (ref 0–0.2)
PLATELET # BLD AUTO: 234 10*3/MM3 (ref 140–450)
PMV BLD AUTO: 9.4 FL (ref 6–12)
POTASSIUM SERPL-SCNC: 4.2 MMOL/L (ref 3.5–5.2)
PROT SERPL-MCNC: 7.1 G/DL (ref 6–8.5)
RBC # BLD AUTO: 4.79 10*6/MM3 (ref 3.77–5.28)
SODIUM SERPL-SCNC: 139 MMOL/L (ref 136–145)
TRIGL SERPL-MCNC: 55 MG/DL (ref 0–150)
TSH SERPL DL<=0.05 MIU/L-ACNC: 1.5 UIU/ML (ref 0.27–4.2)
VLDLC SERPL-MCNC: 11 MG/DL (ref 5–40)
WBC NRBC COR # BLD AUTO: 5.01 10*3/MM3 (ref 3.4–10.8)

## 2025-04-11 PROCEDURE — 80061 LIPID PANEL: CPT

## 2025-04-11 PROCEDURE — 36415 COLL VENOUS BLD VENIPUNCTURE: CPT

## 2025-04-11 PROCEDURE — 84403 ASSAY OF TOTAL TESTOSTERONE: CPT

## 2025-04-11 PROCEDURE — 83036 HEMOGLOBIN GLYCOSYLATED A1C: CPT

## 2025-04-11 PROCEDURE — 84402 ASSAY OF FREE TESTOSTERONE: CPT

## 2025-04-11 PROCEDURE — 80050 GENERAL HEALTH PANEL: CPT

## 2025-04-11 PROCEDURE — 82306 VITAMIN D 25 HYDROXY: CPT

## 2025-04-26 LAB
TESTOST FREE MFR SERPL: 2.27 % (ref 0.5–2.8)
TESTOST FREE SERPL-MCNC: 1.14 NG/DL (ref 0.1–0.85)
TESTOST SERPL-MCNC: 50.1 NG/DL (ref 7–40)